# Patient Record
Sex: MALE | Race: WHITE | NOT HISPANIC OR LATINO | Employment: OTHER | URBAN - METROPOLITAN AREA
[De-identification: names, ages, dates, MRNs, and addresses within clinical notes are randomized per-mention and may not be internally consistent; named-entity substitution may affect disease eponyms.]

---

## 2017-04-07 ENCOUNTER — HOSPITAL ENCOUNTER (EMERGENCY)
Facility: HOSPITAL | Age: 27
Discharge: HOME | End: 2017-04-07
Attending: EMERGENCY MEDICINE
Payer: MEDICAID

## 2017-04-07 VITALS
OXYGEN SATURATION: 100 % | HEIGHT: 70 IN | WEIGHT: 315 LBS | BODY MASS INDEX: 45.1 KG/M2 | TEMPERATURE: 99 F | SYSTOLIC BLOOD PRESSURE: 156 MMHG | RESPIRATION RATE: 14 BRPM | DIASTOLIC BLOOD PRESSURE: 69 MMHG | HEART RATE: 94 BPM

## 2017-04-07 DIAGNOSIS — T40.1X1A HEROIN OVERDOSE, ACCIDENTAL OR UNINTENTIONAL, INITIAL ENCOUNTER: Primary | ICD-10-CM

## 2017-04-07 LAB
ALBUMIN SERPL BCP-MCNC: 4.1 G/DL
ALP SERPL-CCNC: 66 U/L
ALT SERPL W/O P-5'-P-CCNC: 21 U/L
AMPHET+METHAMPHET UR QL: NEGATIVE
ANION GAP SERPL CALC-SCNC: 12 MMOL/L
AST SERPL-CCNC: 20 U/L
BARBITURATES UR QL SCN>200 NG/ML: NEGATIVE
BASOPHILS # BLD AUTO: 0.05 K/UL
BASOPHILS NFR BLD: 0.5 %
BENZODIAZ UR QL SCN>200 NG/ML: NEGATIVE
BILIRUB SERPL-MCNC: 0.3 MG/DL
BUN SERPL-MCNC: 13 MG/DL
BZE UR QL SCN: NEGATIVE
CALCIUM SERPL-MCNC: 8.8 MG/DL
CANNABINOIDS UR QL SCN: NORMAL
CHLORIDE SERPL-SCNC: 104 MMOL/L
CO2 SERPL-SCNC: 24 MMOL/L
CREAT SERPL-MCNC: 1.6 MG/DL
CREAT UR-MCNC: 249.8 MG/DL
DIFFERENTIAL METHOD: ABNORMAL
EOSINOPHIL # BLD AUTO: 0.2 K/UL
EOSINOPHIL NFR BLD: 1.6 %
ERYTHROCYTE [DISTWIDTH] IN BLOOD BY AUTOMATED COUNT: 14.7 %
EST. GFR  (AFRICAN AMERICAN): >60 ML/MIN/1.73 M^2
EST. GFR  (NON AFRICAN AMERICAN): 58 ML/MIN/1.73 M^2
ETHANOL SERPL-MCNC: <10 MG/DL
GLUCOSE SERPL-MCNC: 207 MG/DL
HCT VFR BLD AUTO: 42 %
HGB BLD-MCNC: 13.4 G/DL
LYMPHOCYTES # BLD AUTO: 4.6 K/UL
LYMPHOCYTES NFR BLD: 48.1 %
MCH RBC QN AUTO: 26 PG
MCHC RBC AUTO-ENTMCNC: 31.9 %
MCV RBC AUTO: 81 FL
METHADONE UR QL SCN>300 NG/ML: NEGATIVE
MONOCYTES # BLD AUTO: 0.8 K/UL
MONOCYTES NFR BLD: 8.7 %
NEUTROPHILS # BLD AUTO: 3.9 K/UL
NEUTROPHILS NFR BLD: 41 %
OPIATES UR QL SCN: NORMAL
PCP UR QL SCN>25 NG/ML: NEGATIVE
PLATELET # BLD AUTO: 408 K/UL
PMV BLD AUTO: 9.9 FL
POTASSIUM SERPL-SCNC: 3.5 MMOL/L
PROT SERPL-MCNC: 8.3 G/DL
RBC # BLD AUTO: 5.16 M/UL
SODIUM SERPL-SCNC: 140 MMOL/L
TOXICOLOGY INFORMATION: NORMAL
WBC # BLD AUTO: 9.5 K/UL

## 2017-04-07 PROCEDURE — 63600175 PHARM REV CODE 636 W HCPCS: Performed by: EMERGENCY MEDICINE

## 2017-04-07 PROCEDURE — 80053 COMPREHEN METABOLIC PANEL: CPT

## 2017-04-07 PROCEDURE — 80307 DRUG TEST PRSMV CHEM ANLYZR: CPT

## 2017-04-07 PROCEDURE — 85025 COMPLETE CBC W/AUTO DIFF WBC: CPT

## 2017-04-07 PROCEDURE — 80320 DRUG SCREEN QUANTALCOHOLS: CPT

## 2017-04-07 PROCEDURE — 99284 EMERGENCY DEPT VISIT MOD MDM: CPT | Mod: 25

## 2017-04-07 PROCEDURE — 25000003 PHARM REV CODE 250: Performed by: EMERGENCY MEDICINE

## 2017-04-07 PROCEDURE — 82570 ASSAY OF URINE CREATININE: CPT

## 2017-04-07 PROCEDURE — 93005 ELECTROCARDIOGRAM TRACING: CPT

## 2017-04-07 PROCEDURE — 96374 THER/PROPH/DIAG INJ IV PUSH: CPT

## 2017-04-07 PROCEDURE — 96361 HYDRATE IV INFUSION ADD-ON: CPT

## 2017-04-07 RX ORDER — NALOXONE HCL 0.4 MG/ML
1 VIAL (ML) INJECTION
Status: COMPLETED | OUTPATIENT
Start: 2017-04-07 | End: 2017-04-07

## 2017-04-07 RX ORDER — NALOXONE HCL 0.4 MG/ML
1 VIAL (ML) INJECTION
Status: DISCONTINUED | OUTPATIENT
Start: 2017-04-07 | End: 2017-04-08 | Stop reason: HOSPADM

## 2017-04-07 RX ADMIN — SODIUM CHLORIDE 1000 ML: 0.9 INJECTION, SOLUTION INTRAVENOUS at 05:04

## 2017-04-07 RX ADMIN — NALOXONE HYDROCHLORIDE 1 MG: 0.4 INJECTION, SOLUTION INTRAMUSCULAR; INTRAVENOUS; SUBCUTANEOUS at 05:04
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
## 2017-04-07 NOTE — ED AVS SNAPSHOT
[facility]  [address] [place] [address]               Keith Leavitt   2017  5:25 PM   ED    Description:  Male : 1990   Department:  [facility]           Your Care was Coordinated By:     Provider Role From To    Annabel Null MD Attending Provider 17 [id number] --      Reason for Visit     Unresponsive           Diagnoses this Visit        Comments    Heroin overdose, accidental or unintentional, initial encounter    -  Primary       ED Disposition     ED Disposition Condition Comment    Discharge             To Do List           Follow-up Information     Follow up with Shaquille Cardozo MD In 2 days.    Specialty:  Internal Medicine    Why:  Follow-up    Contact information:    [address] [facility]  [address]  [place] [address]  [phone]        [facility]     [facility] Nurse Care Line -  Assistance  Unless otherwise directed by your provider, please contact [facility], our nurse care line that is available for  assistance.     Registered nurses in the [facility] provide: appointment scheduling, clinical advisement, health education, and other advisory services.  Call: [phone] (toll free)               Medications           Message regarding Medications     Verify the changes and/or additions to your medication regime listed below are the same as discussed with your clinician today.  If any of these changes or additions are incorrect, please notify your healthcare provider.        These medications were administered today        Dose Freq    sodium chloride 0.9% bolus 1,000 mL 1,000 mL ED 1 Time    Sig: Inject 1,000 mLs into the vein ED 1 Time.    Class: Normal    Route: Intravenous    naloxone 0.4 mg/mL injection 1 mg 1 mg ED 1 Time    Sig: Inject 2.5 mLs (1 mg total) into the vein ED 1 Time.    Class: Normal    Route: Intravenous    naloxone 0.4 mg/mL injection 1 mg 1 mg ED 1 Time    Sig:  "Inject 2.5 mLs (1 mg total) into the vein ED 1 Time.    Class: Normal    Route: Intravenous           Verify that the below list of medications is an accurate representation of the medications you are currently taking.  If none reported, the list may be blank. If incorrect, please contact your healthcare provider. Carry this list with you in case of emergency.           Current Medications     ibuprofen (ADVIL,MOTRIN) 600 MG tablet Take 1 tablet (600 mg total) by mouth every 6 (six) hours as needed for Pain.    fish oil-omega-3 fatty acids 300-1,000 mg capsule Take 2 g by mouth once daily.    fluticasone (FLONASE) 50 mcg/actuation nasal spray 1 spray by Each Nare route once daily.    ipratropium (ATROVENT) 0.06 % nasal spray 2 sprays by Nasal route 4 (four) times daily.    loratadine (CLARITIN) 10 mg tablet Take 1 tablet (10 mg total) by mouth once daily.    naloxone 0.4 mg/mL injection 1 mg Inject 2.5 mLs (1 mg total) into the vein ED 1 Time.    ondansetron (ZOFRAN-ODT) 8 MG TbDL Take 1 tablet (8 mg total) by mouth every 12 (twelve) hours as needed.    predniSONE (DELTASONE) 20 MG tablet Please take two pills by mouth daily for four days.           Clinical Reference Information           Your Vitals Were     BP Pulse Temp Resp Height Weight    142/63 92 98.5 °F (36.9 °C) (Oral) 5 5' 10" (1.778 m) 147.4 kg (325 lb)    SpO2 BMI             100% 46.63 kg/m2         Allergies as of 4/7/2017     No Known Allergies      Immunizations Administered on Date of Encounter - 4/7/2017     None      ED Micro, Lab, POCT     Start Ordered       Status Ordering Provider    04/07/17 1733 04/07/17 1732  CBC auto differential  STAT      Final result     04/07/17 1733 04/07/17 1732  Comprehensive metabolic panel  STAT      Final result     04/07/17 1733 04/07/17 1732  Ethanol  STAT      Final result     04/07/17 1733 04/07/17 1732  Drug screen panel, emergency  STAT      Final result       ED Imaging Orders     Start Ordered       " Status Ordering Provider    04/07/17 1731 04/07/17 1732  X-Ray Chest 1 View  1 time imaging      Final result         Discharge Instructions       Stop using heroin.  Return to emergency department for any concerns.  Consider drug rehabilitation.     Discharge References/Attachments     OVERDOSE, OPIATE (ENGLISH)      [facility] Sign-Up     Activating your [facility] account is as easy as 1-2-3!     1) Visit [email or web address], select Sign Up Now, enter this activation code and your date of birth, then select Next.  [email or web address]  Expires: 5/22/2017 10:26 PM      2) Create a username and password to use when you visit [facility] in the future and select a security question in case you lose your password and select Next.    3) Enter your e-mail address and click Sign Up!    Additional Information  If you have questions, please e-mail [email or web address] or call [phone] to talk to our [facility] staff. Remember, [facility] is NOT to be used for urgent needs. For medical emergencies, dial 911.         Smoking Cessation     If you would like to quit smoking:   You may be eligible for free services if you are a [place] resident and started smoking cigarettes before September 1, 1988.  Call the Smoking Cessation Trust (SCT) toll free at (115) 482-5018 or (476) 069-7830.   Call 3-523-QUIT-NOW if you do not meet the above criteria.   Contact us via email: [email or web address]   View our website for more information: [email or web address]         [facility] complies with applicable Federal civil rights laws and does not discriminate on the basis of race, color, national origin, age, disability, or sex.        Language Assistance Services     ATTENTION: Language assistance services are available, free of charge. Please call [phone].      ATENCIÓN: Si habla español, tiene a knowles disposición servicios gratuitos de asistencia lingüística. Llame al [phone].     CHÚ Ý: N?u b?n nói  Ti?ng Vi?t, có các d?ch v? h? tr? ngôn ng? mi?n phí dành cho b?n. G?i s? [phone].

## 2017-04-07 NOTE — ED NOTES
Pt laying in bed in nad. Pt AAOx4. Pt instructed about plan of care. Call light within reach. Will continue to monitor.

## 2017-04-07 NOTE — ED NOTES
"Pt spontaneously opening eyes, pt is able to state his name "Keith", pt admitting to using heroine in the car.   "

## 2017-04-07 NOTE — ED TRIAGE NOTES
"Pt arrived via personal transportation from home. CC of unresponsive. Pt friend stated "He stopped responding to us in the car. I have no idea what happened." Pt presents unresponsive, with bradypnea, pt has pulse, and has pinpoint pupils. MD at bedside, pt placed on monitors will continue to monitor.  "

## 2017-04-08 NOTE — ED NOTES
Pt awake, talking, and states he is thirsty; MD aware; pt given 2 juices, water, and a pudding cup.

## 2017-04-08 NOTE — DISCHARGE INSTRUCTIONS
Stop using heroin.  Return to emergency department for any concerns.  Consider drug rehabilitation.

## 2017-09-13 ENCOUNTER — HOSPITAL ENCOUNTER (EMERGENCY)
Facility: HOSPITAL | Age: 27
Discharge: HOME | End: 2017-09-13
Attending: EMERGENCY MEDICINE
Payer: MEDICAID

## 2017-09-13 VITALS
TEMPERATURE: 98 F | SYSTOLIC BLOOD PRESSURE: 140 MMHG | RESPIRATION RATE: 20 BRPM | BODY MASS INDEX: 44.43 KG/M2 | DIASTOLIC BLOOD PRESSURE: 70 MMHG | WEIGHT: 300 LBS | HEART RATE: 98 BPM | HEIGHT: 69 IN | OXYGEN SATURATION: 100 %

## 2017-09-13 DIAGNOSIS — M25.472 ANKLE SWELLING, LEFT: Primary | ICD-10-CM

## 2017-09-13 DIAGNOSIS — S99.912A ANKLE INJURY, LEFT, INITIAL ENCOUNTER: ICD-10-CM

## 2017-09-13 DIAGNOSIS — M25.572 ANKLE PAIN, LEFT: ICD-10-CM

## 2017-09-13 DIAGNOSIS — M79.672 LEFT FOOT PAIN: ICD-10-CM

## 2017-09-13 PROCEDURE — 63600175 PHARM REV CODE 636 W HCPCS: Performed by: NURSE PRACTITIONER

## 2017-09-13 PROCEDURE — 96372 THER/PROPH/DIAG INJ SC/IM: CPT

## 2017-09-13 PROCEDURE — 25000003 PHARM REV CODE 250: Performed by: NURSE PRACTITIONER

## 2017-09-13 PROCEDURE — 99283 EMERGENCY DEPT VISIT LOW MDM: CPT | Mod: 25

## 2017-09-13 RX ORDER — ACETAMINOPHEN 325 MG/1
650 TABLET ORAL
Status: COMPLETED | OUTPATIENT
Start: 2017-09-13 | End: 2017-09-13

## 2017-09-13 RX ORDER — SULFAMETHOXAZOLE AND TRIMETHOPRIM 800; 160 MG/1; MG/1
1 TABLET ORAL
Status: COMPLETED | OUTPATIENT
Start: 2017-09-13 | End: 2017-09-13

## 2017-09-13 RX ORDER — KETOROLAC TROMETHAMINE 30 MG/ML
30 INJECTION, SOLUTION INTRAMUSCULAR; INTRAVENOUS
Status: COMPLETED | OUTPATIENT
Start: 2017-09-13 | End: 2017-09-13

## 2017-09-13 RX ORDER — NAPROXEN 500 MG/1
500 TABLET ORAL 2 TIMES DAILY PRN
Qty: 10 TABLET | Refills: 0 | Status: SHIPPED | OUTPATIENT
Start: 2017-09-13 | End: 2017-09-18

## 2017-09-13 RX ORDER — SULFAMETHOXAZOLE AND TRIMETHOPRIM 800; 160 MG/1; MG/1
1 TABLET ORAL 2 TIMES DAILY
Qty: 14 TABLET | Refills: 0 | Status: SHIPPED | OUTPATIENT
Start: 2017-09-13 | End: 2017-09-20

## 2017-09-13 RX ADMIN — ACETAMINOPHEN 650 MG: 325 TABLET, FILM COATED ORAL at 02:09

## 2017-09-13 RX ADMIN — KETOROLAC TROMETHAMINE 30 MG: 30 INJECTION, SOLUTION INTRAMUSCULAR at 02:09

## 2017-09-13 RX ADMIN — SULFAMETHOXAZOLE AND TRIMETHOPRIM 1 TABLET: 800; 160 TABLET ORAL at 02:09

## 2017-09-13 NOTE — DISCHARGE INSTRUCTIONS
Please return to the Emergency Department for any new or worsening symptoms including: worsening pain or swelling of the ankle or foot, redness or swelling progressing up the leg, fever, chest pain, shortness of breath, loss of consciousness, dizziness, weakness, or any other concerns.     Please follow up with your Primary Care Provider within in the week. If you do not have one, you may contact the one listed on your discharge paperwork or you may also call the [facility] Appointment Desk at [phone] to schedule an appointment with one.     Please take all medication as prescribed. Bactrim twice a day. You have been prescribed Naproxen for pain. This is an Non-Steroidal Anti-Inflammatory (NSAID) Medication. Please do not take any additional NSAIDs while you are taking this medication including (Advil, Aleve, Motrin, Ibuprofen, Mobic\meloxicam, Naprosyn, etc.). Please stop taking this medication if you experience: weakness, itching, yellow skin or eyes, joint pains, vomiting blood, blood or black stools, unusual weight gain, or swelling in your arms, legs, hands, or feet.     Elevate and Ice to help with pain and swelling.
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
## 2018-05-19 ENCOUNTER — HOSPITAL ENCOUNTER (EMERGENCY)
Facility: HOSPITAL | Age: 28
Discharge: HOME | End: 2018-05-19
Attending: EMERGENCY MEDICINE
Payer: MEDICAID

## 2018-05-19 VITALS
DIASTOLIC BLOOD PRESSURE: 57 MMHG | WEIGHT: 270 LBS | HEIGHT: 69 IN | RESPIRATION RATE: 22 BRPM | HEART RATE: 79 BPM | SYSTOLIC BLOOD PRESSURE: 126 MMHG | TEMPERATURE: 100 F | OXYGEN SATURATION: 93 % | BODY MASS INDEX: 39.99 KG/M2

## 2018-05-19 DIAGNOSIS — S22.080A COMPRESSION FRACTURE OF T12 VERTEBRA: Primary | ICD-10-CM

## 2018-05-19 DIAGNOSIS — S39.012A LUMBAR STRAIN, INITIAL ENCOUNTER: ICD-10-CM

## 2018-05-19 DIAGNOSIS — M25.572 LEFT ANKLE PAIN: ICD-10-CM

## 2018-05-19 DIAGNOSIS — M25.572 ACUTE LEFT ANKLE PAIN: ICD-10-CM

## 2018-05-19 DIAGNOSIS — M79.605 LEFT LEG PAIN: ICD-10-CM

## 2018-05-19 PROCEDURE — 29515 APPLICATION SHORT LEG SPLINT: CPT | Mod: LT

## 2018-05-19 PROCEDURE — 99284 EMERGENCY DEPT VISIT MOD MDM: CPT | Mod: 25

## 2018-05-19 PROCEDURE — 63600175 PHARM REV CODE 636 W HCPCS: Performed by: EMERGENCY MEDICINE

## 2018-05-19 PROCEDURE — 96374 THER/PROPH/DIAG INJ IV PUSH: CPT | Mod: 59

## 2018-05-19 RX ORDER — GABAPENTIN 600 MG/1
600 TABLET ORAL 3 TIMES DAILY
COMMUNITY

## 2018-05-19 RX ORDER — KETOROLAC TROMETHAMINE 30 MG/ML
60 INJECTION, SOLUTION INTRAMUSCULAR; INTRAVENOUS
Status: DISCONTINUED | OUTPATIENT
Start: 2018-05-19 | End: 2018-05-19

## 2018-05-19 RX ORDER — HYDROCODONE BITARTRATE AND ACETAMINOPHEN 5; 325 MG/1; MG/1
1 TABLET ORAL EVERY 4 HOURS PRN
Qty: 12 TABLET | Refills: 0 | Status: SHIPPED | OUTPATIENT
Start: 2018-05-19 | End: 2018-05-31

## 2018-05-19 RX ORDER — KETOROLAC TROMETHAMINE 30 MG/ML
30 INJECTION, SOLUTION INTRAMUSCULAR; INTRAVENOUS
Status: COMPLETED | OUTPATIENT
Start: 2018-05-19 | End: 2018-05-19

## 2018-05-19 RX ORDER — IBUPROFEN 600 MG/1
600 TABLET ORAL EVERY 6 HOURS PRN
Qty: 20 TABLET | Refills: 0 | Status: SHIPPED | OUTPATIENT
Start: 2018-05-19

## 2018-05-19 RX ADMIN — KETOROLAC TROMETHAMINE 30 MG: 30 INJECTION, SOLUTION INTRAMUSCULAR; INTRAVENOUS at 07:05

## 2018-05-20 NOTE — DISCHARGE INSTRUCTIONS
Please wear your walking boot.  Please use crutches.  Please follow-up with your orthopedic surgeon this week.  Return immediately if you get worse or if new problems develop.  Rest.  Ibuprofen for pain.  Hydrocodone for pain.

## 2018-05-20 NOTE — ED PROVIDER NOTES
Encounter Date: 5/19/2018       History     Chief Complaint   Patient presents with    Fall     fall from stairs occuring today. Pt c/o pain 10/10 and swelling in the left foot and ankle. He denies LOC      HPI   This 27-year-old male presents to the emergency room complaining of low lumbar back pain, left leg and ankle pain after a fall today.  He walks with crutches.  He had a surgery on his left ankle 13 months ago [facility] by Dr. Hamilton.  Patient has had pain since.  He cannot walk without crutches.  The patient does have chronic low lumbar back pain that comes and goes.  His pain is worse today after the fall.  He is otherwise well without other injuries or problems there is no history of head or neck injuries.  The patient has no chest or abdominal complaints.  The  Review of patient's allergies indicates:  No Known Allergies  Past Medical History:   Diagnosis Date    Hypertension     Ventricular tachycardia     in childhood     Past Surgical History:   Procedure Laterality Date    LEG SURGERY      SKIN GRAFT Left 2017     Family History   Problem Relation Age of Onset    Diabetes Mother     Hypertension Father      Social History   Substance Use Topics    Smoking status: Current Every Day Smoker     Packs/day: 1.00     Types: Cigarettes    Smokeless tobacco: Never Used    Alcohol use Yes      Comment: occassionally     Review of Systems  The patient was questioned specifically with regard to the following.  General: Fever, chills, sweats. Neuro: Headache. Eyes: eye problems. ENT: Ear pain, sore throat. Cardiovascular: Chest pain. Respiratory: Cough, shortness of breath. Gastrointestinal: Abdominal pain, vomiting, diarrhea. Genitourinary: Painful urination.  Musculoskeletal: Arm and leg problems. Skin: Rash.  The review of systems was negative except for the following:  Back pain, left leg pain, left ankle pain  Physical Exam     Initial Vitals [05/19/18 1855]   BP Pulse Resp Temp SpO2    (!) 186/79 82 20 99.1 °F (37.3 °C) 97 %      MAP       114.67         Physical Exam  The patient was examined specifically for the following:   General:No significant distress, Good color, Warm and dry. Head and neck:Scalp atraumatic, Neck supple. Neurological:Appropriate conversation, Gross motor deficits. Eyes:Conjugate gaze, Clear corneas. ENT: No epistaxis. Cardiac: Regular rate and rhythm, Grossly normal heart tones. Pulmonary: Wheezing, Rales. Gastrointestinal: Abdominal tenderness, Abdominal distention. Musculoskeletal: Extremity deformity, Apparent pain with range of motion of the joints. Skin: Rash.   The findings on examination were normal except for the following:  The patient has swelling of the left ankle.  Left ankle is tender.  There is tenderness of the left leg distally.  There is tenderness of the low lumbar back.  Patient appears to be in discomfort.  There is no bony deformity.  There are no abrasions or contusions on the skin.  There is no erythema warmth or ecchymosis of any joints.  ED Course   Procedures  Labs Reviewed - No data to display       X-Rays:   Independently Interpreted Readings:   Other Readings:  X-rays of the left ankle reveal a fractured screw in the soft tissues anteriorly on the left.  The remainder the orthopedic prostheses appear to be in place.  No acute fractures are demonstrated in the left ankle or tibia or fibula.  The patient has a old-appearing T12 compression fracture           Medical decision making:  This patient had a fall today.  He has had a fracture of the left ankle.  He has had chronic pain in that left ankle since the surgery 13 months ago.  He cannot walk on it.  He continues to use crutches.  He fell today re-injuring the ankle.  There is a loose screw fragment in the anterior soft tissue of the left ankle.  I will try to put this patient in a walking boot.  I will have him continue to use his crutches.  He complains of low lumbar back pain. He has a  low T12 compression fracture that looks old.  I find no acute injuries to the lumbar spine.  I will discharge this patient to follow up with his orthopedic surgeon.                 Clinical Impression:   The primary encounter diagnosis was Compression fracture of T12 vertebra. Diagnoses of Left ankle pain, Left leg pain, Lumbar strain, initial encounter, and Acute left ankle pain were also pertinent to this visit.                           Derian Menchaca MD  05/19/18 0126

## 2018-05-20 NOTE — ED NOTES
"Pt reports left leg pain. States as going down steps and missed step. "I landed on the side of my foot."  Reports hx of left ankle sx secondary to fx.  Scar noted to ankle.  Left foot and lower leg swollen, hot to touch.  Discoloration noted to skin.  Pulses strong and intact.   "

## 2018-05-20 NOTE — ED NOTES
Walking boot to left leg by DOROTHY Mancia with instructions of use.  D'C instructions, RX to pt. All questions answered at this time.
